# Patient Record
Sex: FEMALE | Race: WHITE | URBAN - METROPOLITAN AREA
[De-identification: names, ages, dates, MRNs, and addresses within clinical notes are randomized per-mention and may not be internally consistent; named-entity substitution may affect disease eponyms.]

---

## 2017-07-31 ENCOUNTER — OFFICE VISIT (OUTPATIENT)
Dept: FAMILY MEDICINE | Facility: CLINIC | Age: 22
End: 2017-07-31
Payer: COMMERCIAL

## 2017-07-31 VITALS
RESPIRATION RATE: 16 BRPM | WEIGHT: 172 LBS | DIASTOLIC BLOOD PRESSURE: 59 MMHG | HEIGHT: 64 IN | TEMPERATURE: 98 F | SYSTOLIC BLOOD PRESSURE: 106 MMHG | BODY MASS INDEX: 29.37 KG/M2 | HEART RATE: 88 BPM | OXYGEN SATURATION: 98 %

## 2017-07-31 DIAGNOSIS — L73.9 FOLLICULITIS: Primary | ICD-10-CM

## 2017-07-31 DIAGNOSIS — L02.211 CUTANEOUS ABSCESS OF ABDOMINAL WALL: ICD-10-CM

## 2017-07-31 PROCEDURE — 99203 OFFICE O/P NEW LOW 30 MIN: CPT | Performed by: PHYSICIAN ASSISTANT

## 2017-07-31 RX ORDER — CEPHALEXIN 500 MG/1
500 CAPSULE ORAL 3 TIMES DAILY
Qty: 30 CAPSULE | Refills: 0 | Status: SHIPPED | OUTPATIENT
Start: 2017-07-31 | End: 2021-06-15

## 2017-07-31 NOTE — MR AVS SNAPSHOT
After Visit Summary   7/31/2017    Sherice Perez    MRN: [id number]           Patient Information     Date Of Birth          1995        Visit Information        Provider Department      7/31/2017 4:20 PM Jeet Yo PA-C [facility]        Today's Diagnoses     Folliculitis    -  1    Cutaneous abscess of abdominal wall           Follow-ups after your visit        Additional Services     DERMATOLOGY REFERRAL       Your provider has referred you to: [facility]: Dermatology Clinic [facility] [phone]   [email or web address]    Please be aware that coverage of these services is subject to the terms and limitations of your health insurance plan.  Call member services at your health plan with any benefit or coverage questions.      Please bring the following with you to your appointment:    (1) Any X-Rays, CTs or MRIs which have been performed.  Contact the facility where they were done to arrange for  prior to your scheduled appointment.    (2) List of current medications  (3) This referral request   (4) Any documents/labs given to you for this referral                  Who to contact     If you have questions or need follow up information about today's clinic visit or your schedule please contact [facility] directly at [phone].  Normal or non-critical lab and imaging results will be communicated to you by MyChart, letter or phone within 4 business days after the clinic has received the results. If you do not hear from us within 7 days, please contact the clinic through MyChart or phone. If you have a critical or abnormal lab result, we will notify you by phone as soon as possible.  Submit refill requests through [employer] or call your pharmacy and they will forward the refill request to us. Please allow 3 business days for your refill to be completed.          Additional Information About Your Visit        MyChart  "Information     [employer] lets you send messages to your doctor, view your test results, renew your prescriptions, schedule appointments and more. To sign up, go to [email or web address] . Click on \"Log in\" on the left side of the screen, which will take you to the Welcome page. Then click on \"Sign up Now\" on the right side of the page.     You will be asked to enter the access code listed below, as well as some personal information. Please follow the directions to create your username and password.     Your access code is: [email or web address]  Expires: 10/29/2017  4:45 PM     Your access code will  in 90 days. If you need help or a new code, please call your [place] clinic or [phone].        Care EveryWhere ID     This is your Care EveryWhere ID. This could be used by other organizations to access your [place] medical records  [id number]        Your Vitals Were     Pulse Temperature Respirations Height Last Period Pulse Oximetry    88 98  F (36.7  C) (Oral) 16 5' 3.5\" (1.613 m) 2017 98%    BMI (Body Mass Index)                   29.99 kg/m2            Blood Pressure from Last 3 Encounters:   17 106/59    Weight from Last 3 Encounters:   17 172 lb (78 kg)              We Performed the Following     DERMATOLOGY REFERRAL          Today's Medication Changes          These changes are accurate as of: 17  4:45 PM.  If you have any questions, ask your nurse or doctor.               Start taking these medicines.        Dose/Directions    cephALEXin 500 MG capsule   Commonly known as:  KEFLEX   Used for:  Cutaneous abscess of abdominal wall, Folliculitis   Started by:  Jeet Yo PA-C        Dose:  500 mg   Take 1 capsule (500 mg) by mouth 3 times daily   Quantity:  30 capsule   Refills:  0       chlorhexidine 4 % liquid   Commonly known as:  HIBICLENS   Used for:  Folliculitis   Started by:  Jeet Yo PA-C        Apply topically daily as needed   Quantity:  236 mL "   Refills:  0            Where to get your medicines      These medications were sent to [facility] - [facility] [address]  [address] [address]     Phone:  [phone]     cephALEXin 500 MG capsule    chlorhexidine 4 % liquid                Primary Care Provider    None Specified       No primary provider on file.        Equal Access to Services     [facility]: Hadii aad ku hadasho Soomaali, waaxda luqadaha, qaybta kaalmada adeegyada, waxay idiin hayaan adeeg kharash lacarolee . So [facility] [phone].    ATENCIÓN: Si habla español, tiene a pisano disposición servicios gratuitos de asistencia lingüística. Kailash al [phone].    We comply with applicable federal civil rights laws and [place] laws. We do not discriminate on the basis of race, color, national origin, age, disability sex, sexual orientation or gender identity.            Thank you!     Thank you for choosing [facility]  for your care. Our goal is always to provide you with excellent care. Hearing back from our patients is one way we can continue to improve our services. Please take a few minutes to complete the written survey that you may receive in the mail after your visit with us. Thank you!             Your Updated Medication List - Protect others around you: Learn how to safely use, store and throw away your medicines at www.disposemymeds.org.          This list is accurate as of: 7/31/17  4:45 PM.  Always use your most recent med list.                   Brand Name Dispense Instructions for use Diagnosis    cephALEXin 500 MG capsule    KEFLEX    30 capsule    Take 1 capsule (500 mg) by mouth 3 times daily    Cutaneous abscess of abdominal wall, Folliculitis       chlorhexidine 4 % liquid    HIBICLENS    236 mL    Apply topically daily as needed    Folliculitis

## 2017-07-31 NOTE — PROGRESS NOTES
SUBJECTIVE:                                                    Sherice Perez is a 21 year old female who presents to clinic today for the following health issues:      Concern - Cyst   Onset: 1 month     Description:   Patient states that first cyst was taken care of by her OBGYN since it was on the labia and then she had 3 on her thighs, and now her back.      Intensity: moderate    Progression of Symptoms:  worsening    Accompanying Signs & Symptoms:  Pain, blood and puss     Previous history of similar problem:   no    Precipitating factors:   Worsened by: nothing     Alleviating factors:  Improved by: nothing     Therapies Tried and outcome: patient has been OBGYN and has drained them herself           Problem list and histories reviewed & adjusted, as indicated.  Additional history: 22 y/o NP female here to discuss some cysts/infections over the last month or so.  The first one she had was on labia, and did have to get drained at GYN.  She then got another couple near the area.  She did open them up herself without intervention.  She did take antibiotic that was given to her at , but this was split up between two different.     She did use the same razor, which may have attributed to them.  She has since throw that away.    She has started to get another on her left side, and near her labia.        There is no problem list on file for this patient.    History reviewed. No pertinent surgical history.    Social History   Substance Use Topics     Smoking status: Never Smoker     Smokeless tobacco: Never Used     Alcohol use Yes     History reviewed. No pertinent family history.          Reviewed and updated as needed this visit by clinical staff     Reviewed and updated as needed this visit by Provider         ROS:  Constitutional, HEENT, cardiovascular, pulmonary, gi and gu systems are negative, except as otherwise noted.      OBJECTIVE:   /59  Pulse 88  Temp 98  F (36.7  C) (Oral)  Resp 16  Ht  "5' 3.5\" (1.613 m)  Wt 172 lb (78 kg)  LMP 07/27/2017  SpO2 98%  BMI 29.99 kg/m2  Body mass index is 29.99 kg/(m^2).  GENERAL: alert and no distress  EYES: Eyes grossly normal to inspection  RESP: lungs clear to auscultation - no rales, rhonchi or wheezes  CV: regular rate and rhythm, normal S1 S2, no S3 or S4, no murmur, click or rub, no peripheral edema and peripheral pulses strong  SKIN: erythematous pustule over left flank    Diagnostic Test Results:  none     ASSESSMENT/PLAN:             1. Folliculitis  Discussed avoid shaving for the next few weeks, very well could be aggravating these lesions.  Will start antibiotic soap and oral treamtent.  - chlorhexidine (HIBICLENS) 4 % liquid; Apply topically daily as needed  Dispense: 236 mL; Refill: 0  - cephALEXin (KEFLEX) 500 MG capsule; Take 1 capsule (500 mg) by mouth 3 times daily  Dispense: 30 capsule; Refill: 0  - DERMATOLOGY REFERRAL    2. Cutaneous abscess of abdominal wall  Due to recurrent nature, will refer to derm.  If lesion on labia becomes more painful, did encourage her to follow up with GYN for possible drainage.  I did explain that I do not have much experience with I&D in that area.  - cephALEXin (KEFLEX) 500 MG capsule; Take 1 capsule (500 mg) by mouth 3 times daily  Dispense: 30 capsule; Refill: 0  - DERMATOLOGY REFERRAL        Jeet Yo PA-C  [facility]  "

## 2017-07-31 NOTE — NURSING NOTE
"Chief Complaint   Patient presents with     Derm Problem     /59  Pulse 88  Temp 98  F (36.7  C) (Oral)  Resp 16  Ht 5' 3.5\" (1.613 m)  Wt 172 lb (78 kg)  LMP 07/27/2017  SpO2 98%  BMI 29.99 kg/m2 Estimated body mass index is 29.99 kg/(m^2) as calculated from the following:    Height as of this encounter: 5' 3.5\" (1.613 m).    Weight as of this encounter: 172 lb (78 kg).  bp completed using cuff size: regular       Health Maintenance addressed:  pap    declined    Radha Castle MA     "

## 2020-12-27 ENCOUNTER — HEALTH MAINTENANCE LETTER (OUTPATIENT)
Age: 25
End: 2020-12-27

## 2021-06-15 ENCOUNTER — OFFICE VISIT (OUTPATIENT)
Dept: OBGYN | Facility: CLINIC | Age: 26
End: 2021-06-15
Attending: NURSE PRACTITIONER
Payer: COMMERCIAL

## 2021-06-15 VITALS
SYSTOLIC BLOOD PRESSURE: 106 MMHG | HEART RATE: 62 BPM | WEIGHT: 161 LBS | BODY MASS INDEX: 28.53 KG/M2 | DIASTOLIC BLOOD PRESSURE: 58 MMHG | HEIGHT: 63 IN

## 2021-06-15 DIAGNOSIS — N94.89 ADNEXAL MASS: Primary | ICD-10-CM

## 2021-06-15 DIAGNOSIS — K64.4 EXTERNAL HEMORRHOIDS: ICD-10-CM

## 2021-06-15 DIAGNOSIS — F32.81 PMDD (PREMENSTRUAL DYSPHORIC DISORDER): ICD-10-CM

## 2021-06-15 LAB — TSH SERPL DL<=0.005 MIU/L-ACNC: 1.3 MU/L (ref 0.4–4)

## 2021-06-15 PROCEDURE — 84443 ASSAY THYROID STIM HORMONE: CPT | Performed by: NURSE PRACTITIONER

## 2021-06-15 PROCEDURE — G0463 HOSPITAL OUTPT CLINIC VISIT: HCPCS

## 2021-06-15 PROCEDURE — 84270 ASSAY OF SEX HORMONE GLOBUL: CPT | Performed by: NURSE PRACTITIONER

## 2021-06-15 PROCEDURE — 36415 COLL VENOUS BLD VENIPUNCTURE: CPT | Performed by: NURSE PRACTITIONER

## 2021-06-15 PROCEDURE — 84403 ASSAY OF TOTAL TESTOSTERONE: CPT | Performed by: NURSE PRACTITIONER

## 2021-06-15 PROCEDURE — 99204 OFFICE O/P NEW MOD 45 MIN: CPT | Performed by: NURSE PRACTITIONER

## 2021-06-15 ASSESSMENT — PAIN SCALES - GENERAL: PAINLEVEL: NO PAIN (0)

## 2021-06-15 ASSESSMENT — PATIENT HEALTH QUESTIONNAIRE - PHQ9
5. POOR APPETITE OR OVEREATING: SEVERAL DAYS
SUM OF ALL RESPONSES TO PHQ QUESTIONS 1-9: 12

## 2021-06-15 ASSESSMENT — ANXIETY QUESTIONNAIRES
6. BECOMING EASILY ANNOYED OR IRRITABLE: MORE THAN HALF THE DAYS
5. BEING SO RESTLESS THAT IT IS HARD TO SIT STILL: SEVERAL DAYS
1. FEELING NERVOUS, ANXIOUS, OR ON EDGE: MORE THAN HALF THE DAYS
3. WORRYING TOO MUCH ABOUT DIFFERENT THINGS: SEVERAL DAYS
7. FEELING AFRAID AS IF SOMETHING AWFUL MIGHT HAPPEN: NOT AT ALL
GAD7 TOTAL SCORE: 8
2. NOT BEING ABLE TO STOP OR CONTROL WORRYING: SEVERAL DAYS

## 2021-06-15 ASSESSMENT — MIFFLIN-ST. JEOR: SCORE: 1439.42

## 2021-06-15 NOTE — PROGRESS NOTES
"Subjective:  Sherice Perez is a 25 yr old female, , who presents to clinic today with the following concerns:     1. Premenstrual symptoms: Patient reports having significant mental health symptoms in the week leading up to her period, which end after her menses begins. States she feels irritable, \"breaks down,\" and has depressive symptoms. Also has physical symptoms of bloating and holds onto water weight. Feels fatigued. \"Can't think straight\". Symptoms are affecting her QOL.  Symptoms significantly improve outside of this premenstrual week. Sherice had a Mirena IUD removed 2020 after having it in place for 5 yrs; she did not experience any PMS symptoms with the IUD in place. Not currently using any form of contraception and not desiring a pregnancy.     2. PCOS: Was given the diagnosis of PCOS at age 10 or 11 when she had a right oophorectomy due to large cyst; never had laboratory evaluation.  Pt denies acne, hirsutism (other than dark hair on legs), male pattern balding or changes in voice. Weight has been stable. Periods are monthly. Bleeding lasts for 7 days, moderate in amount. Changes pad 2-3 times per day on heaviest days.  Patient's last menstrual period was 2021.  Tries to eat low-estrogenic foods.     3. Adnexal mass: Pt has had a left adnexal mass, 5cm in size, consistent in size from 2019 - 2019. No imaging since that time.     3. Hemorrhoids and anal itching: Started 1 yr ago. Did a cleanse for pin worm despite low suspicion from PCP at that time. Itching was worse after drinking tea or coffee.  Feels an external hemorrhoid. Currently asymptomatic. Has not tried any medications to relief the pain/ itching when it occurs. Denies constipation. 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 Pt denies mucus or blood in the stool.    Sexual Hx:  - Sexually active with male partner.   - Contraceptive experience: none currently; Has taken birth control pills before " "and didn't like the way it made her feel. Had mental health changes. Did like the Mirena IUD but had difficulty with removal.    Past Medical History:   Diagnosis Date     Boils 2017- 2018, on her back     Depression      PCOS (polycystic ovarian syndrome)      Past Medical History:   Diagnosis Date     Boils 2017- 2018, on her back     Depression      PCOS (polycystic ovarian syndrome)      History reviewed. No pertinent family history.    Objective:  /58   Pulse 62   Ht 1.6 m (5' 3\")   Wt 73 kg (161 lb)   LMP 06/12/2021   Breastfeeding No   BMI 28.52 kg/m    General: pleasant female in no acute distress  Psych: normal mentation, well oriented  Skin: no acne present; no evidence of hirsutism on face; dark colored hair on legs  Respiratory: unlabored breathing  Musculoskeletal: no gross deformities  Pelvic Exam:  External genitalia: normal hair distribution  Rectum: soft, skin colored hemorrhoid present externally; non-tender, normal sphincter tone    Assessment:  Encounter Diagnoses   Name Primary?     Adnexal mass Yes     PMDD (premenstrual dysphoric disorder)      External hemorrhoids        Plan:  1. PMDD:  - Counseled on options to manage PMDD symptoms. Pt had complete relief of symptoms when she had the Mirena IUD. She is interested in getting one again. Pt will schedule appointment for insertion. May consider selective serotonin reuptake inhibitor if symptoms do not improve with Mirena IUD. Counseled on importance of not having unprotected intercourse for the 2 weeks prior to appointment to be able to ensure she is not pregnant at the time of insertion.     2. Possible PCOS:   - Patient does not fit the [place] criteria for diagnosis based on symptoms. She has regular menses and does not present with symptoms of elevated androgens. Ultrasound ordered. TSH and total and free testosterone ordered. Will follow-up with patient when results are available.    3. External hemorrhoids: Counseled " for options for management. Not often is it recommended that they are surgically removed. Recommended use of Preparation H on hemorrhoids when itchy or painful. Continue to manage stools so that they are soft. If it continues to be bothersome, pt to reach out for referral to colorectal surgery/.     4. Adnexal Mass: Recommended follow-up on 5cm adnexal mass with ultrasound.     Pt expressed understanding and agreement with the plan for care.    A total of 50 minutes was spent in chart prep, patient care, education, and documentation on the day of visit.     Fabiola Henry, DNP, APRN, WHNP

## 2021-06-15 NOTE — LETTER
"6/15/2021       RE: Sherice Perez  [address]  [place] [address]     Dear Colleague,    Thank you for referring your patient, Sherice Perez, to the [facility] [place] at [facility]. Please see a copy of my visit note below.    Subjective:  Sherice Perez is a 25 yr old female, , who presents to clinic today with the following concerns:     1. Premenstrual symptoms: Patient reports having significant mental health symptoms in the week leading up to her period, which end after her menses begins. States she feels irritable, \"breaks down,\" and has depressive symptoms. Also has physical symptoms of bloating and holds onto water weight. Feels fatigued. \"Can't think straight\". Symptoms are affecting her QOL.  Symptoms significantly improve outside of this premenstrual week. Sherice had a Mirena IUD removed 2020 after having it in place for 5 yrs; she did not experience any PMS symptoms with the IUD in place. Not currently using any form of contraception and not desiring a pregnancy.     2. PCOS: Was given the diagnosis of PCOS at age 10 or 11 when she had a right oophorectomy due to large cyst; never had laboratory evaluation.  Pt denies acne, hirsutism (other than dark hair on legs), male pattern balding or changes in voice. Weight has been stable. Periods are monthly. Bleeding lasts for 7 days, moderate in amount. Changes pad 2-3 times per day on heaviest days.  Patient's last menstrual period was 2021.  Tries to eat low-estrogenic foods.     3. Adnexal mass: Pt has had a left adnexal mass, 5cm in size, consistent in size from 2019 - 2019. No imaging since that time.     3. Hemorrhoids and anal itching: Started 1 yr ago. Did a cleanse for pin worm despite low suspicion from PCP at that time. Itching was worse after drinking tea or coffee.  Feels an external hemorrhoid. Currently asymptomatic. Has not tried " "any medications to relief the pain/ itching when it occurs. Denies constipation. 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 Pt denies mucus or blood in the stool.    Sexual Hx:  - Sexually active with male partner.   - Contraceptive experience: none currently; Has taken birth control pills before and didn't like the way it made her feel. Had mental health changes. Did like the Mirena IUD but had difficulty with removal.    Past Medical History:   Diagnosis Date     Boils 2017- 2018, on her back     Depression      PCOS (polycystic ovarian syndrome)      Past Medical History:   Diagnosis Date     Boils 2017- 2018, on her back     Depression      PCOS (polycystic ovarian syndrome)      History reviewed. No pertinent family history.    Objective:  /58   Pulse 62   Ht 1.6 m (5' 3\")   Wt 73 kg (161 lb)   LMP 06/12/2021   Breastfeeding No   BMI 28.52 kg/m    General: pleasant female in no acute distress  Psych: normal mentation, well oriented  Skin: no acne present; no evidence of hirsutism on face; dark colored hair on legs  Respiratory: unlabored breathing  Musculoskeletal: no gross deformities  Pelvic Exam:  External genitalia: normal hair distribution  Rectum: soft, skin colored hemorrhoid present externally; non-tender, normal sphincter tone    Assessment:  Encounter Diagnoses   Name Primary?     Adnexal mass Yes     PMDD (premenstrual dysphoric disorder)      External hemorrhoids        Plan:  1. PMDD:  - Counseled on options to manage PMDD symptoms. Pt had complete relief of symptoms when she had the Mirena IUD. She is interested in getting one again. Pt will schedule appointment for insertion. May consider selective serotonin reuptake inhibitor if symptoms do not improve with Mirena IUD. Counseled on importance of not having unprotected intercourse for the 2 weeks prior to appointment to be able to ensure she is not pregnant at the time of insertion.     2. Possible PCOS: "   - Patient does not fit the [place] criteria for diagnosis based on symptoms. She has regular menses and does not present with symptoms of elevated androgens. Ultrasound ordered. TSH and total and free testosterone ordered. Will follow-up with patient when results are available.    3. External hemorrhoids: Counseled for options for management. Not often is it recommended that they are surgically removed. Recommended use of Preparation H on hemorrhoids when itchy or painful. Continue to manage stools so that they are soft. If it continues to be bothersome, pt to reach out for referral to colorectal surgery/.     4. Adnexal Mass: Recommended follow-up on 5cm adnexal mass with ultrasound.     Pt expressed understanding and agreement with the plan for care.    A total of 50 minutes was spent in chart prep, patient care, education, and documentation on the day of visit.     Fabiola Henry, MARCO ANTONIO, APRN, WHNP            Again, thank you for allowing me to participate in the care of your patient.      Sincerely,    Fabiola Henry, WES CNP

## 2021-06-15 NOTE — LETTER
Date:July 3, 2021      Patient was self referred, no letter generated. Do not send.        [facility] Health Information

## 2021-06-15 NOTE — NURSING NOTE
Chief Complaint   Patient presents with     Establish Care     HX PCOS , C/O hemorrhoids   Sara De Leon LPN

## 2021-06-16 ASSESSMENT — ANXIETY QUESTIONNAIRES: GAD7 TOTAL SCORE: 8

## 2021-06-17 LAB
SHBG SERPL-SCNC: 121 NMOL/L (ref 30–135)
TESTOST FREE SERPL-MCNC: 0.21 NG/DL (ref 0.08–0.74)
TESTOST SERPL-MCNC: 35 NG/DL (ref 8–60)

## 2021-10-09 ENCOUNTER — HEALTH MAINTENANCE LETTER (OUTPATIENT)
Age: 26
End: 2021-10-09

## 2021-11-16 NOTE — TELEPHONE ENCOUNTER
RECORDS RECEIVED FROM: Hemorrhoids   Appt date: 2/23/2022   NOTES STATUS DETAILS   OFFICE NOTE from referring provider  Internal [facility]:  6/15/21 - OBGYN OV with Fabiola Henry NP   OFFICE NOTE from other specialist   [facility]:  7[facility]/17 - PCC OV with NAZANIN Leach   DISCHARGE SUMMARY from hospital  N/A    DISCHARGE REPORT from the ER N/A    OPERATIVE REPORT  N/A    MEDICATION LIST Internal    LABS     PFC TESTING N/A    ANAL PAP N/A    BIOPSIES/PATHOLOGY RELATED TO DIAGNOSIS N/A    DIAGNOSTIC PROCEDURES     COLONOSCOPY N/A    UPPER ENDOSCOPY (EGD) N/A    FLEX SIGMOIDOSCOPY  N/A    ERCP N/A    IMAGING (DISC & REPORT)      CT  N/A    MRI N/A    XRAY N/A    ULTRASOUND (ENDOANAL/ENDORECTAL) N/A

## 2022-01-29 ENCOUNTER — HEALTH MAINTENANCE LETTER (OUTPATIENT)
Age: 27
End: 2022-01-29

## 2022-02-18 ENCOUNTER — MYC MEDICAL ADVICE (OUTPATIENT)
Dept: SURGERY | Facility: CLINIC | Age: 27
End: 2022-02-18
Payer: COMMERCIAL

## 2022-02-18 NOTE — CONFIDENTIAL NOTE
Sent upcoming appointment reminder via [employer].     Appt date/time: 2/23/2022 at 1:00 PM  Provider: TAMMY Golden  Appt type: paul Solis CMA

## 2022-02-23 ENCOUNTER — PRE VISIT (OUTPATIENT)
Dept: SURGERY | Facility: CLINIC | Age: 27
End: 2022-02-23

## 2022-09-17 ENCOUNTER — HEALTH MAINTENANCE LETTER (OUTPATIENT)
Age: 27
End: 2022-09-17

## 2023-05-06 ENCOUNTER — HEALTH MAINTENANCE LETTER (OUTPATIENT)
Age: 28
End: 2023-05-06

## 2023-11-14 ENCOUNTER — OFFICE VISIT (OUTPATIENT)
Dept: OBGYN | Facility: CLINIC | Age: 28
End: 2023-11-14
Payer: COMMERCIAL

## 2023-11-14 VITALS
SYSTOLIC BLOOD PRESSURE: 102 MMHG | OXYGEN SATURATION: 98 % | HEIGHT: 64 IN | WEIGHT: 164.4 LBS | DIASTOLIC BLOOD PRESSURE: 57 MMHG | BODY MASS INDEX: 28.07 KG/M2 | HEART RATE: 57 BPM

## 2023-11-14 DIAGNOSIS — Z01.419 WELL WOMAN EXAM WITH ROUTINE GYNECOLOGICAL EXAM: Primary | ICD-10-CM

## 2023-11-14 DIAGNOSIS — Z12.4 CERVICAL CANCER SCREENING: ICD-10-CM

## 2023-11-14 DIAGNOSIS — Z87.42 HISTORY OF PCOS: ICD-10-CM

## 2023-11-14 DIAGNOSIS — Z11.4 ENCOUNTER FOR SCREENING FOR HIV: ICD-10-CM

## 2023-11-14 DIAGNOSIS — Z11.59 ENCOUNTER FOR HEPATITIS C SCREENING TEST FOR LOW RISK PATIENT: ICD-10-CM

## 2023-11-14 PROCEDURE — 99385 PREV VISIT NEW AGE 18-39: CPT | Performed by: OBSTETRICS & GYNECOLOGY

## 2023-11-14 PROCEDURE — G0145 SCR C/V CYTO,THINLAYER,RESCR: HCPCS | Performed by: OBSTETRICS & GYNECOLOGY

## 2023-11-14 PROCEDURE — 99213 OFFICE O/P EST LOW 20 MIN: CPT | Mod: 25 | Performed by: OBSTETRICS & GYNECOLOGY

## 2023-11-14 ASSESSMENT — ANXIETY QUESTIONNAIRES
7. FEELING AFRAID AS IF SOMETHING AWFUL MIGHT HAPPEN: NOT AT ALL
3. WORRYING TOO MUCH ABOUT DIFFERENT THINGS: MORE THAN HALF THE DAYS
GAD7 TOTAL SCORE: 9
5. BEING SO RESTLESS THAT IT IS HARD TO SIT STILL: SEVERAL DAYS
6. BECOMING EASILY ANNOYED OR IRRITABLE: MORE THAN HALF THE DAYS
GAD7 TOTAL SCORE: 9
IF YOU CHECKED OFF ANY PROBLEMS ON THIS QUESTIONNAIRE, HOW DIFFICULT HAVE THESE PROBLEMS MADE IT FOR YOU TO DO YOUR WORK, TAKE CARE OF THINGS AT HOME, OR GET ALONG WITH OTHER PEOPLE: SOMEWHAT DIFFICULT
1. FEELING NERVOUS, ANXIOUS, OR ON EDGE: MORE THAN HALF THE DAYS
2. NOT BEING ABLE TO STOP OR CONTROL WORRYING: SEVERAL DAYS

## 2023-11-14 ASSESSMENT — PATIENT HEALTH QUESTIONNAIRE - PHQ9
5. POOR APPETITE OR OVEREATING: SEVERAL DAYS
SUM OF ALL RESPONSES TO PHQ QUESTIONS 1-9: 6

## 2023-11-14 NOTE — PROGRESS NOTES
"Sherice is a 28 year old  female who presents for annual exam.     Menses are regular q 28-30 days and normal and variable lasting 7 days.  Menses flow: spotty.  Patient's last menstrual period was 2023.. Using Mirena IUD for contraception.  She is not currently considering pregnancy.  Besides routine health maintenance,  she would like to discuss hormonal issues and periods coming earlier every month.  Patient would also like to discuss history of PCOS.  Patient reports that she has always thought she had PCOS.  She states she was diagnosed in her teens and has lived her life with the perspective that she has PCOS.  She states since  she has had a couple of negative experiences with gyn offices.  She was told at a clinic visit at [facility] that she does NOT have PCOS.  She also shares that she felt rushed and dismissed during some of her clinic visit.  The patient denies any severe or cystic acne.  She denies any hirsutism.   Last periods have been , , , 10/15, and .  Her impression of these periods was that they were irregular because they did not occur on the same day each month.  She also thought that one of the reasons she previously was diagnosed with PCOS was due to an ovary needing to be removed at age 10 due to an ovarian cyst.    GYNECOLOGIC HISTORY:  Menarche: 13  Age at first intercourse: 18 Number of lifetime partners: >6  Sherice is sexually active with 1 male partner(s) and is currently in monogamous relationship with partner.    History sexually transmitted infections: none  STI testing offered?  Declined  SHARAN exposure: No  History of abnormal Pap smear: NO - age 21-29 PAP every 3 years recommended  Family history of breast CA: No  Family history of uterine/ovarian CA: No    Family history of colon CA: No    HEALTH MAINTENANCE:  Cholesterol: (No results found for: \"CHOL\" History of abnormal lipids: No  Mammo: Yes . History of abnormal Mammo: No.  Regular Self " "Breast Exams: No  Calcium/Vitamin D intake: source:  dairy, dietary supplement(s) Adequate? Yes  TSH: (  TSH   Date Value Ref Range Status   06/15/2021 1.30 0.40 - 4.00 mU/L Final    )  17 NILM    HISTORY:  OB History    Para Term  AB Living   0 0 0 0 0 0   SAB IAB Ectopic Multiple Live Births   0 0 0 0 0     Past Medical History:   Diagnosis Date    Boils     2017- 2018, on her back    Depression     PCOS (polycystic ovarian syndrome)      Past Surgical History:   Procedure Laterality Date    OOPHORECTOMY Left     age 11     No family history on file.  Social History     Denies any tobacco, alcohol, or drug use in pregnancy   Meds- none   No Known Allergies    Past medical, surgical, social and family history were reviewed and updated in EPIC.    EXAM:  /57   Pulse 57   Ht 1.626 m (5' 4\")   Wt 74.6 kg (164 lb 6.4 oz)   LMP 2023   SpO2 98%   BMI 28.22 kg/m     BMI: Body mass index is 28.22 kg/m .  Constitutional: healthy, alert and no distress  Head: Normocephalic. No masses, lesions, tenderness or abnormalities  Neck: Neck supple. Trachea midline. No adenopathy. Thyroid symmetric, normal size.   Cardiovascular: regular rate and rhythm  Respiratory: clear to auscultation bilaterally   Breasts: normal without suspicious masses, skin changes or axillary nodes.  Gastrointestinal: Abdomen soft, non-tender, non-distended.  : normal appearing external genitalia, normal appearing vaginal mucosa, normal appearing cervix, small amount of thin white vaginal discharge   Musculoskeletal: extremities normal  Skin: no suspicious lesions or rashes  Psychiatric: Affect appropriate, cooperative,mentation appears normal.       ASSESSMENT:  28 year old female with satisfactory annual exam     (Z01.419) Well woman exam with routine gynecological exam  (primary encounter diagnosis)  COUNSELING:   Reviewed preventive health counseling, as reflected in patient instructions       Contraception       " Consider Hep C screening for all patients one time for ages 18-79 years       HIV screeninx in teen years, 1x in adult years, and at intervals if high risk   reports that she has never smoked. She has never used smokeless tobacco.    Body mass index is 28.22 kg/m .    (Z11.4) Encounter for screening for HIV  Comment: routine screen  Plan: HIV Antigen Antibody Combo    (Z11.59) Encounter for hepatitis C screening test for low risk patient  Comment: routine screen  Plan: Hepatitis C Screen Reflex to HCV RNA Quant and         Genotype    (Z12.4) Cervical cancer screening  Comment: due  Plan: Pap screen reflex to HPV if ASCUS - recommend         age 25 - 29    (Z87.42) History of PCOS  Comment: Patient expresses some confusion about her reported history of PCOS and being told she does not have this diagnosis in recent years.  Discussed with patient the diagnostic criteria of PCOS and to make diagnosis you need at least 2/3 criteria.  Went through patient's most recent periods and they are q27-28 days.  Explained they are on a different date each month because there are different number of days in different months. Patient has regular menses, thus does not meet criteria for irregular periods for PCOS.  On my review of available ultrasound reports from 2019 I do not see any mention of polycystic appearing ovaries.  Offered patient a repeat ultrasound to assess for this.  Also discussed physical signs of hyperandrogenism, which patient denies.  Offered lab testing for testosterone and DHEAS.  Patient will think about the option of ultrasound and labs, but declines at this time.  We discussed importance of screening for diabetes and menstrual regulation with patient who have a diagnosis of PCOS since they are at higher risk of developing diabetes.  Patient reports she has had a normal HgbA1c within the last couple years.     Plan: Patient will contact clinic if she desires to move forward with ultrasound and blood draw  for androgen testing.     Tahmina Bradford MD

## 2023-11-16 LAB
BKR LAB AP GYN ADEQUACY: NORMAL
BKR LAB AP GYN INTERPRETATION: NORMAL
BKR LAB AP HPV REFLEX: NORMAL
BKR LAB AP LMP: NORMAL
BKR LAB AP PREVIOUS ABNORMAL: NORMAL
PATH REPORT.COMMENTS IMP SPEC: NORMAL
PATH REPORT.COMMENTS IMP SPEC: NORMAL
PATH REPORT.RELEVANT HX SPEC: NORMAL

## 2024-02-27 ENCOUNTER — APPOINTMENT (OUTPATIENT)
Dept: LAB | Facility: CLINIC | Age: 29
End: 2024-02-27

## 2024-02-27 ENCOUNTER — OFFICE VISIT (OUTPATIENT)
Dept: OBGYN | Facility: CLINIC | Age: 29
End: 2024-02-27

## 2024-02-27 VITALS
OXYGEN SATURATION: 98 % | DIASTOLIC BLOOD PRESSURE: 67 MMHG | HEART RATE: 68 BPM | BODY MASS INDEX: 28.56 KG/M2 | WEIGHT: 166.4 LBS | SYSTOLIC BLOOD PRESSURE: 107 MMHG

## 2024-02-27 DIAGNOSIS — Z11.4 ENCOUNTER FOR SCREENING FOR HIV: ICD-10-CM

## 2024-02-27 DIAGNOSIS — Z11.59 ENCOUNTER FOR HEPATITIS C SCREENING TEST FOR LOW RISK PATIENT: ICD-10-CM

## 2024-02-27 DIAGNOSIS — E66.3 OVERWEIGHT (BMI 25.0-29.9): ICD-10-CM

## 2024-02-27 DIAGNOSIS — R53.83 OTHER FATIGUE: Primary | ICD-10-CM

## 2024-02-27 DIAGNOSIS — F41.9 ANXIETY: ICD-10-CM

## 2024-02-27 PROCEDURE — 87389 HIV-1 AG W/HIV-1&-2 AB AG IA: CPT | Performed by: OBSTETRICS & GYNECOLOGY

## 2024-02-27 PROCEDURE — 99214 OFFICE O/P EST MOD 30 MIN: CPT | Performed by: OBSTETRICS & GYNECOLOGY

## 2024-02-27 PROCEDURE — 86803 HEPATITIS C AB TEST: CPT | Performed by: OBSTETRICS & GYNECOLOGY

## 2024-02-27 PROCEDURE — 36415 COLL VENOUS BLD VENIPUNCTURE: CPT | Performed by: OBSTETRICS & GYNECOLOGY

## 2024-02-27 PROCEDURE — 84443 ASSAY THYROID STIM HORMONE: CPT | Performed by: OBSTETRICS & GYNECOLOGY

## 2024-02-27 ASSESSMENT — ANXIETY QUESTIONNAIRES
IF YOU CHECKED OFF ANY PROBLEMS ON THIS QUESTIONNAIRE, HOW DIFFICULT HAVE THESE PROBLEMS MADE IT FOR YOU TO DO YOUR WORK, TAKE CARE OF THINGS AT HOME, OR GET ALONG WITH OTHER PEOPLE: SOMEWHAT DIFFICULT
1. FEELING NERVOUS, ANXIOUS, OR ON EDGE: MORE THAN HALF THE DAYS
GAD7 TOTAL SCORE: 12
2. NOT BEING ABLE TO STOP OR CONTROL WORRYING: MORE THAN HALF THE DAYS
GAD7 TOTAL SCORE: 12
6. BECOMING EASILY ANNOYED OR IRRITABLE: MORE THAN HALF THE DAYS
5. BEING SO RESTLESS THAT IT IS HARD TO SIT STILL: SEVERAL DAYS
7. FEELING AFRAID AS IF SOMETHING AWFUL MIGHT HAPPEN: SEVERAL DAYS
3. WORRYING TOO MUCH ABOUT DIFFERENT THINGS: MORE THAN HALF THE DAYS

## 2024-02-27 ASSESSMENT — PATIENT HEALTH QUESTIONNAIRE - PHQ9
SUM OF ALL RESPONSES TO PHQ QUESTIONS 1-9: 13
5. POOR APPETITE OR OVEREATING: MORE THAN HALF THE DAYS

## 2024-02-27 NOTE — PROGRESS NOTES
"[facility]- [facility]    CC: PCOS discussion, fatigue    S:Sherice Perez is a 28 year old  who presents today for discussion of PCOS and hormones.  Patient reports after her last visit she didn't remember if she does have PCOS or not.  She also is concerned about her weight and fatigue.  She explains that she has been working on cutting out sugar, alcohol, and increasing exercise.  She then got COVID 19 in Feb with symptoms of sore throat and fatigue.  She feels overall that is improving.  She is wondering if there is anything hormonal that could be causing her fatigue.  She also feels like her identity was informed by her prior impression she had PCOS and now she feels she needs to shift her frame of thought away from that.      We discussed her prior work up for PCOS and that she does NOT meet [place] criteria for the diagnosis based on 1) she has regular monthly periods 2) her testosterone testing was WNL on 6/15/21.  3)ultrasound on 19 showing \"Normal appearing left ovary is 4 x 2 x 1 cm with physiologic follicles and normal color and spectral Doppler blood flow \".      O: Patient Vitals for the past 24 hrs:   BP Pulse SpO2 Weight   24 1333 107/67 68 98 % 75.5 kg (166 lb 6.4 oz)   ]  Exam:  General- awake, alert, answering questions appropriately, appears comfortable  CV- regular rate  Lung- breathing comfortably on room air    A&P: Sherice Perez is a 28 year old  who presents today for discussion of PCOS and hormones.     (R53.83) Other fatigue  (primary encounter diagnosis)  Comment:  Patient reports some fatigue.  Discussed option of testing thyroid function which she would like to proceed with.  Plan: TSH with free T4 reflex    (E66.3) Overweight (BMI 25.0-29.9)  Comment: Patient states she is working on weight loss.  Interested in resources.  Plan: Adult Comprehensive Weight Management         Referral    (F41.9) Anxiety  Comment: Patient expresses a high " level of anxiety.  She does not work with a therapist or psych at this point.  Interested in establishing care.  Would like to avoid medication at this time.  Plan: Adult Mental Health  Referral    (Z11.4) Encounter for screening for HIV  Comment: routine screen  Plan: done    (Z11.59) Encounter for hepatitis C screening test for low risk patient  Comment: routine screen  Plan: done    Tahmina Bradford MD    A total of 30 minutes was spent on 2/27/24 reviewing records, discussion with patient, counseling patient, and on documentation.

## 2024-02-28 LAB
HCV AB SERPL QL IA: NONREACTIVE
HIV 1+2 AB+HIV1 P24 AG SERPL QL IA: NONREACTIVE
TSH SERPL DL<=0.005 MIU/L-ACNC: 1.59 UIU/ML (ref 0.3–4.2)

## 2025-01-12 ENCOUNTER — HEALTH MAINTENANCE LETTER (OUTPATIENT)
Age: 30
End: 2025-01-12